# Patient Record
Sex: FEMALE | Race: WHITE | NOT HISPANIC OR LATINO
[De-identification: names, ages, dates, MRNs, and addresses within clinical notes are randomized per-mention and may not be internally consistent; named-entity substitution may affect disease eponyms.]

---

## 2021-10-21 ENCOUNTER — APPOINTMENT (OUTPATIENT)
Dept: PLASTIC SURGERY | Facility: CLINIC | Age: 46
End: 2021-10-21
Payer: SELF-PAY

## 2021-10-21 PROBLEM — Z00.00 ENCOUNTER FOR PREVENTIVE HEALTH EXAMINATION: Status: ACTIVE | Noted: 2021-10-21

## 2021-10-21 PROCEDURE — 99212 OFFICE O/P EST SF 10 MIN: CPT | Mod: NC

## 2021-11-05 ENCOUNTER — APPOINTMENT (OUTPATIENT)
Dept: PLASTIC SURGERY | Facility: CLINIC | Age: 46
End: 2021-11-05
Payer: SELF-PAY

## 2021-11-05 PROCEDURE — 15789 CHEMICAL PEEL FACIAL DERMAL: CPT | Mod: 78

## 2021-12-09 ENCOUNTER — APPOINTMENT (OUTPATIENT)
Dept: PLASTIC SURGERY | Facility: CLINIC | Age: 46
End: 2021-12-09
Payer: SELF-PAY

## 2021-12-09 PROCEDURE — 15789 CHEMICAL PEEL FACIAL DERMAL: CPT | Mod: 78

## 2021-12-30 ENCOUNTER — APPOINTMENT (OUTPATIENT)
Dept: PLASTIC SURGERY | Facility: CLINIC | Age: 46
End: 2021-12-30
Payer: SELF-PAY

## 2021-12-30 PROCEDURE — 15789 CHEMICAL PEEL FACIAL DERMAL: CPT | Mod: 78

## 2022-01-13 ENCOUNTER — TRANSCRIPTION ENCOUNTER (OUTPATIENT)
Age: 47
End: 2022-01-13

## 2022-02-18 ENCOUNTER — APPOINTMENT (OUTPATIENT)
Dept: PLASTIC SURGERY | Facility: CLINIC | Age: 47
End: 2022-02-18
Payer: SELF-PAY

## 2022-02-18 PROCEDURE — 15789 CHEMICAL PEEL FACIAL DERMAL: CPT | Mod: 78

## 2022-04-20 ENCOUNTER — APPOINTMENT (OUTPATIENT)
Dept: PLASTIC SURGERY | Facility: CLINIC | Age: 47
End: 2022-04-20
Payer: SELF-PAY

## 2022-04-20 PROCEDURE — 15789 CHEMICAL PEEL FACIAL DERMAL: CPT | Mod: 78

## 2022-05-08 ENCOUNTER — APPOINTMENT (OUTPATIENT)
Dept: AFTER HOURS CARE | Facility: EMERGENCY ROOM | Age: 47
End: 2022-05-08
Payer: COMMERCIAL

## 2022-05-08 PROCEDURE — 99204 OFFICE O/P NEW MOD 45 MIN: CPT | Mod: 95

## 2022-05-08 NOTE — ASSESSMENT
[FreeTextEntry1] : 47 yo woman with PMHx of Post-treatment Lyme disease syndrome, depression and anxiety states that she just went away for a conference to [place].  She returned a few days ago.  Yesterday noted some mild congestion and sore throat.  Today awoke with myalgias, cough, nausea, and headache.  No loss of tasted or smell.  No vomiting or diarrhea.  No fever.  Patient does not have a pulse oximeter.  She did a home test today that was positive.  She is full vaccinated and booster was received on 11/21.  She felt some mild SOB at one point, but is fine at this time and has no exertional SOB.  \par \par Impression:\par \par Mild COVID with low risk for progression

## 2022-05-08 NOTE — PHYSICAL EXAM
[No Acute Distress] : no acute distress [Well Nourished] : well nourished [Well Developed] : well developed [Well-Appearing] : well-appearing [Normal Sclera/Conjunctiva] : normal sclera/conjunctiva [Normal Outer Ear/Nose] : the outer ears and nose were normal in appearance [No Respiratory Distress] : no respiratory distress  [No Accessory Muscle Use] : no accessory muscle use [No Joint Swelling] : no joint swelling [No Rash] : no rash [No Focal Deficits] : no focal deficits [Speech Grossly Normal] : speech grossly normal [Normal Affect] : the affect was normal [Alert and Oriented x3] : oriented to person, place, and time [Normal Insight/Judgement] : insight and judgment were intact

## 2022-05-08 NOTE — HISTORY OF PRESENT ILLNESS
[Home] : at home, [unfilled] , at the time of the visit. [Other Location: e.g. Home (Enter Location, City,State)___] : at [unfilled] [Verbal consent obtained from patient] : the patient, [unfilled] [FreeTextEntry8] : 47 yo woman with PMHx of Post-treatment Lyme disease syndrome, depression and anxiety states that she just went away for a conference to [place].  She returned a few days ago.  Yesterday noted some mild congestion and sore throat.  Today awoke with myalgias, cough, nausea, and headache.  No loss of tasted or smell.  No vomiting or diarrhea.  No fever.  Patient does not have a pulse oximeter.  She did a home test today that was positive.  She is full vaccinated and booster was received on 11/21.  She felt some mild SOB at one point, but is fine at this time and has no exertional SOB.

## 2022-05-08 NOTE — REVIEW OF SYSTEMS
[Fever] : no fever [Chills] : no chills [Fatigue] : fatigue [Hot Flashes] : no hot flashes [Hoarseness] : no hoarseness [Nasal Discharge] : nasal discharge [Sore Throat] : sore throat [Postnasal Drip] : postnasal drip [Shortness Of Breath] : shortness of breath [Wheezing] : no wheezing [Cough] : cough [Dyspnea on Exertion] : no dyspnea on exertion [Abdominal Pain] : no abdominal pain [Nausea] : nausea [Diarrhea] : diarrhea [Vomiting] : no vomiting [Melena] : no melena [Joint Stiffness] : no joint stiffness [Joint Swelling] : no joint swelling [Muscle Weakness] : no muscle weakness [Muscle Pain] : muscle pain [Back Pain] : no back pain [Headache] : headache [Dizziness] : no dizziness [Fainting] : no fainting [Confusion] : no confusion [Memory Loss] : no memory loss [Unsteady Walking] : no ataxia [Negative] : Heme/Lymph

## 2022-05-24 ENCOUNTER — RESULT REVIEW (OUTPATIENT)
Age: 47
End: 2022-05-24

## 2022-06-02 ENCOUNTER — APPOINTMENT (OUTPATIENT)
Dept: PLASTIC SURGERY | Facility: CLINIC | Age: 47
End: 2022-06-02
Payer: SELF-PAY

## 2022-06-02 PROCEDURE — 15789 CHEMICAL PEEL FACIAL DERMAL: CPT | Mod: 78

## 2022-07-14 ENCOUNTER — APPOINTMENT (OUTPATIENT)
Dept: PLASTIC SURGERY | Facility: CLINIC | Age: 47
End: 2022-07-14

## 2022-07-14 PROCEDURE — 15789 CHEMICAL PEEL FACIAL DERMAL: CPT | Mod: 78

## 2022-10-03 ENCOUNTER — APPOINTMENT (OUTPATIENT)
Dept: PLASTIC SURGERY | Facility: CLINIC | Age: 47
End: 2022-10-03

## 2022-10-03 PROCEDURE — 15789 CHEMICAL PEEL FACIAL DERMAL: CPT | Mod: 78

## 2022-10-06 ENCOUNTER — APPOINTMENT (OUTPATIENT)
Dept: PLASTIC SURGERY | Facility: CLINIC | Age: 47
End: 2022-10-06

## 2022-10-18 ENCOUNTER — APPOINTMENT (OUTPATIENT)
Dept: PLASTIC SURGERY | Facility: CLINIC | Age: 47
End: 2022-10-18

## 2022-10-18 VITALS
SYSTOLIC BLOOD PRESSURE: 122 MMHG | TEMPERATURE: 98.7 F | HEART RATE: 75 BPM | OXYGEN SATURATION: 95 % | DIASTOLIC BLOOD PRESSURE: 76 MMHG | RESPIRATION RATE: 20 BRPM

## 2022-10-18 DIAGNOSIS — U07.1 COVID-19: ICD-10-CM

## 2022-10-18 PROCEDURE — 99203 OFFICE O/P NEW LOW 30 MIN: CPT | Mod: NC

## 2022-10-19 RX ORDER — ESCITALOPRAM OXALATE 10 MG/1
10 TABLET ORAL
Qty: 30 | Refills: 0 | Status: ACTIVE | COMMUNITY
Start: 2022-09-29

## 2022-10-19 RX ORDER — ALPRAZOLAM 0.25 MG/1
0.25 TABLET ORAL
Qty: 10 | Refills: 0 | Status: ACTIVE | COMMUNITY
Start: 2022-09-29

## 2022-10-19 NOTE — HISTORY OF PRESENT ILLNESS
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 The instructions were reviewed in detail with MARLYN.

## 2022-10-19 NOTE — ASSESSMENT
[FreeTextEntry1] : MARLYN is prepared for series of micro needle with prp sessions The risks, benefits, alternatives, limitations and the permanent scars were outlined with the patient. All of MARLYN 's questions and concerns were addressed and answered completely

## 2022-11-09 ENCOUNTER — APPOINTMENT (OUTPATIENT)
Dept: PLASTIC SURGERY | Facility: CLINIC | Age: 47
End: 2022-11-09

## 2022-11-09 PROCEDURE — 99211 OFF/OP EST MAY X REQ PHY/QHP: CPT | Mod: NC

## 2022-11-15 ENCOUNTER — APPOINTMENT (OUTPATIENT)
Dept: PLASTIC SURGERY | Facility: CLINIC | Age: 47
End: 2022-11-15

## 2022-11-15 PROCEDURE — 15789 CHEMICAL PEEL FACIAL DERMAL: CPT

## 2022-12-06 ENCOUNTER — APPOINTMENT (OUTPATIENT)
Dept: PLASTIC SURGERY | Facility: CLINIC | Age: 47
End: 2022-12-06

## 2022-12-06 PROCEDURE — 15789 CHEMICAL PEEL FACIAL DERMAL: CPT

## 2022-12-12 NOTE — PROCEDURE
[Nl] : None [FreeTextEntry1] : rhytids  [FreeTextEntry2] : micro needle prp [FreeTextEntry6] : Ms. MARLYN OLVERA is 47 year Non- or  female complaining of skin laxity and rhytids.  She desires micro needling with Platelet Rich Plasma (PRP) for improvement in the skin tone.  The risks, benefits, alternatives, limitations and potential for permanent scarring were outlined with the patient preoperatively.   Under topical anesthetic and aseptic conditions, the patient was treated with blood drawn from a peripheral vein.  The blood was centrifuged with a proprietary tube to separate the red blood cells from the platelet rich plasma.  Approximately 6 cc of PRP was harvested and  prepared.  Micro needling was performed on the face and neck and tolerated well.  The platelet rich plasma was used as a lubricant for and as an addition to permeate the micro holes created by the instrument.  Please see accompanying sheet for depth of needle per facial or neck site.  She tolerated the procedure well and instructions were given  . Aquaphor was applied at the completion and a follow up appointment was made.\par

## 2022-12-12 NOTE — ASSESSMENT
[FreeTextEntry1] : MARLYN tolerated the procedure well. The instructions were reviewed in detail with MARLYN. All of MARLYN 's questions and concerns were addressed and answered completely MARLYN will return to the office for a post procedure visit

## 2022-12-19 NOTE — ASSESSMENT
[FreeTextEntry1] : MARLYN tolerated the procedure well. .oinst All of MARLYN 's questions and concerns were addressed and answered completely MARLYN will return to the office for another procedure visit 3 weeks

## 2023-01-03 ENCOUNTER — APPOINTMENT (OUTPATIENT)
Dept: PLASTIC SURGERY | Facility: CLINIC | Age: 48
End: 2023-01-03

## 2023-01-03 ENCOUNTER — APPOINTMENT (OUTPATIENT)
Dept: PLASTIC SURGERY | Facility: CLINIC | Age: 48
End: 2023-01-03
Payer: SELF-PAY

## 2023-01-03 PROCEDURE — 15789 CHEMICAL PEEL FACIAL DERMAL: CPT

## 2023-01-09 NOTE — ASSESSMENT
[FreeTextEntry1] : MARLYN tolerated the procedure well. MARLYN  has had uncomplicated recovery from surgery and anesthesia The instructions were reviewed in detail with MARLYN. All of MARLYN 's questions and concerns were addressed and answered completely MARLYN will return to the office for a post procedure visit

## 2023-01-09 NOTE — PROCEDURE
[FreeTextEntry6] : Ms. MARLYN OLVERA is 47 year Non- or  female complaining of skin laxity and rhytids.  She desires micro needling with Platelet Rich Plasma (PRP) for improvement in the skin tone.  The risks, benefits, alternatives, limitations and potential for permanent scarring were outlined with the patient preoperatively.   Under topical anesthetic and aseptic conditions, the patient was treated with blood drawn from a peripheral vein.  The blood was centrifuged with a proprietary tube to separate the red blood cells from the platelet rich plasma.  Approximately 6 cc of PRP was harvested and  prepared.  Micro needling was performed on the face and neck and tolerated well.  The platelet rich plasma was used as a lubricant for and as an addition to permeate the micro holes created by the instrument.  Please see accompanying sheet for depth of needle per facial or neck site.  She tolerated the procedure well and instructions were given  . Aquaphor was applied at the completion and a follow up appointment was made.

## 2023-03-03 ENCOUNTER — APPOINTMENT (OUTPATIENT)
Dept: PLASTIC SURGERY | Facility: CLINIC | Age: 48
End: 2023-03-03
Payer: SELF-PAY

## 2023-03-03 PROCEDURE — 15789 CHEMICAL PEEL FACIAL DERMAL: CPT

## 2023-04-11 ENCOUNTER — APPOINTMENT (OUTPATIENT)
Dept: PLASTIC SURGERY | Facility: CLINIC | Age: 48
End: 2023-04-11
Payer: SELF-PAY

## 2023-04-11 PROCEDURE — 15789 CHEMICAL PEEL FACIAL DERMAL: CPT

## 2023-05-30 ENCOUNTER — APPOINTMENT (OUTPATIENT)
Dept: PLASTIC SURGERY | Facility: CLINIC | Age: 48
End: 2023-05-30
Payer: SELF-PAY

## 2023-05-30 PROCEDURE — 15789 CHEMICAL PEEL FACIAL DERMAL: CPT

## 2023-06-29 ENCOUNTER — APPOINTMENT (OUTPATIENT)
Dept: NEUROLOGY | Facility: CLINIC | Age: 48
End: 2023-06-29
Payer: COMMERCIAL

## 2023-06-29 VITALS
DIASTOLIC BLOOD PRESSURE: 60 MMHG | OXYGEN SATURATION: 95 % | WEIGHT: 130 LBS | BODY MASS INDEX: 22.2 KG/M2 | HEART RATE: 66 BPM | HEIGHT: 64 IN | TEMPERATURE: 98 F | SYSTOLIC BLOOD PRESSURE: 90 MMHG

## 2023-06-29 PROCEDURE — 99203 OFFICE O/P NEW LOW 30 MIN: CPT

## 2023-06-29 RX ORDER — NIRMATRELVIR AND RITONAVIR 300-100 MG
20 X 150 MG & KIT ORAL
Qty: 1 | Refills: 0 | Status: DISCONTINUED | COMMUNITY
Start: 2022-05-08 | End: 2023-06-29

## 2023-06-30 NOTE — CONSULT LETTER
[Dear  ___] : Dear  [unfilled], [Consult Letter:] : I had the pleasure of evaluating your patient, [unfilled]. [Please see my note below.] : Please see my note below. [Consult Closing:] : Thank you very much for allowing me to participate in the care of this patient.  If you have any questions, please do not hesitate to contact me. [Sincerely,] : Sincerely, [FreeTextEntry3] : Mary Tinsley PA-C

## 2023-06-30 NOTE — PHYSICAL EXAM
[FreeTextEntry1] : Constitutional: Well nourished, well developed adult in no distress\par Psych: Mood broad, affect not restricted\par Head: NCAT\par Eyes: Anicteric\par Ears: Normal appearing pinna\par Neck: Normal appearing and not enlarged\par Lungs: No accessory muscle use, no respiratory distress\par Cardiac/Vascular: No edema\par Abdomen: Non-distended\par Skin: No visible lesions or rashes. Skin is warm and dry\par \par Neurological Examination\par Mental status: Awake, alert, appropriate.  Gives detailed history. oriented to person, place, and time.\par \par Cranial nerves  \par II: Visual acuity grossly intact, visual fields full\par III, IV, VI: PERRLA, EOMI, no nystagmus \par V: Facial sensation is normal B/L.    \par VII: Facial strength is normal B/L.  \par VIII:   Hearing grossly intact bilaterally to spoken voice although not formally assessed\par IX, X: Palate is midline and elevates symmetrically.    \par XI: Trapezius normal strength.    \par XII: Tongue midline without atrophy or fasciculations.    \par \par Speech: No aphasia, dysarthria.  Normal intonation and prosody of speech\par Motor: Normal muscle bulk and tone throughout.  \par Muscle Strength: 5/5 muscle strength throughout bilateral upper and lower extremities with assessed shoulder abduction, elbow flexion/extension, hand , hip flexion, knee flexion/extension, ankle plantarflexion/dorsiflexion\par Reflexes: 2+ reflexes throughout. \par Coordination No ataxia - FTN, HTS within normal limits. No tremor or dysmetria\par Sensory: Sensation to light touch intact bilateral upper and lower extremities\par Station: No loss of balance.\par Gait  Normal stance, no loss of balance

## 2023-06-30 NOTE — DATA REVIEWED
[de-identified] : MRI brain with/without contrast: Unremarkable study \par MRA brain with/without contrast: Unremarkable brain MRA

## 2023-06-30 NOTE — ASSESSMENT
[FreeTextEntry1] : Patient is a 46yo female who presents for evaluation of exercise induced headaches.  Patient has unremarkable MRI and MRA head/neck, will obtain MRV to exclude disruption of venous circulation as etiology of headaches.  Suspect that this may be related to benign primary exercise headache as she meets all criteria at this time.   \par \par -Obtain MRV head to rule out secondary cause of headache\par -Patient recommended to avoid significant strenuous activity while evaluating further, headache has been present for 10 years without complication, unlikely at this point but recommend out of an abundance of caution.  No restriction on activities such as weight lifting that otherwise do not seem to provoke headaches.\par -Patient defers need for pharmacologic therapy at this time, if so requested the use of indomethacin as prophylaxis prior to exercise would be optimal therapy\par -Follow up in 6-8 weeks to review imaging studies and discuss need for medications at this time

## 2023-06-30 NOTE — HISTORY OF PRESENT ILLNESS
[FreeTextEntry1] : Patient is a 46yo female with unremarkable past medical history otherwise Lyme (treated with doxy), anxiety, depression who presents for evaluation of headache induced by cardiovascular activity.\par \par Frequency: Only with with cardio\par Severity: max 9/10, typically 5-6/10\par Duration: Lasts for a few hours to remainder of the day\par Quality: Pulsating\par Location: More on right side, sometimes into neck/behind the eyes\par Photophobia/Phonophobia: +photophobia when severe, negative phonophobia\par Nausea/Vomiting: +nausea when severe, otherwise negative\par Sensory auras: None\par Other associated symptoms: Neck stiffness noticed (although chronic difficult to isolate), most severe had slightly lightheadedness negative SOB, chest pain.\par Disabling Deficits: None\par Medication Trial: Tylenol/advil takes edge\par Alleviating Factors: Sleep, OTC meds \par Aggravating Factors: Exercise/intense cardio - does not occur with strength workout\par Positional Component: No\par Other: Negative increase with valsalva. Headaches preceded lyme disease. \par \par She is scheduled for a nerve ablation in her neck (at unclear level) this week.  She also has history of migraine with aura, most prominently aura rather than pain.  For the most part able to manage with acetaminophen/ibuprofen for these. She has chronic neck pain which is unchanged and unrelated to current issue.  \par \par She finds that if she exercises, typically with cardio exercise, she reliably develops a headache.  She has had these on and off for about 10 years.  She had Lyme disease and had some tingling and brain fog from this.  She tends to have soft blood pressure and can feel lightheadedness.  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  She has had MRI brain w/wo contrast and MRA which were unremarkable.  She is primarily concerned about ruling out sinister underlying causes rather than treatment based management. 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

## 2023-08-14 ENCOUNTER — RESULT REVIEW (OUTPATIENT)
Age: 48
End: 2023-08-14

## 2023-08-15 ENCOUNTER — TRANSCRIPTION ENCOUNTER (OUTPATIENT)
Age: 48
End: 2023-08-15

## 2023-08-15 ENCOUNTER — APPOINTMENT (OUTPATIENT)
Dept: PLASTIC SURGERY | Facility: CLINIC | Age: 48
End: 2023-08-15
Payer: COMMERCIAL

## 2023-08-15 PROCEDURE — 15789 CHEMICAL PEEL FACIAL DERMAL: CPT | Mod: 78

## 2023-08-21 NOTE — REASON FOR VISIT
[TextEntry] : Emily came in for hydrafacial treatment. 30% glysal applied. extractions, blue and red light applied, facial massaged with daily power, spf and aox

## 2023-09-07 ENCOUNTER — APPOINTMENT (OUTPATIENT)
Dept: NEUROLOGY | Facility: CLINIC | Age: 48
End: 2023-09-07
Payer: COMMERCIAL

## 2023-09-07 VITALS
HEIGHT: 64 IN | BODY MASS INDEX: 22.2 KG/M2 | TEMPERATURE: 98.2 F | SYSTOLIC BLOOD PRESSURE: 90 MMHG | DIASTOLIC BLOOD PRESSURE: 60 MMHG | OXYGEN SATURATION: 98 % | WEIGHT: 130 LBS | HEART RATE: 86 BPM

## 2023-09-07 PROCEDURE — 99213 OFFICE O/P EST LOW 20 MIN: CPT

## 2023-09-07 RX ORDER — INDOMETHACIN 25 MG/1
25 CAPSULE ORAL
Qty: 10 | Refills: 1 | Status: ACTIVE | COMMUNITY
Start: 2023-09-07 | End: 1900-01-01

## 2023-09-07 NOTE — HISTORY OF PRESENT ILLNESS
[FreeTextEntry1] : Patient is a 46yo female with unremarkable past medical history otherwise Lyme (treated with doxy), anxiety, depression who presents for evaluation of headache induced by cardiovascular activity.  9/7/23 She is no longer running which is related to other orthopedic issues.  She finds that it would cause issues only about once weekly.  She has not had one since having last visit due to change in activity.  She gets tension type headaches.  She had a nerve ablation since last visit.  She gets an aura of migraine prior to cycle.    Headaches remain stable from last visit, she is avoiding triggers so they have been less frequent but remain the same quality Frequency: Only with with intense cardio Severity: max 9/10, typically 5-6/10 Duration: Lasts for a few hours to remainder of the day Quality: Pulsating Location: More on right side, sometimes into neck/behind the eyes Photophobia/Phonophobia: +photophobia when severe, negative phonophobia Nausea/Vomiting: +nausea when severe, otherwise negative Sensory auras: None Other associated symptoms: Neck stiffness noticed (although chronic difficult to isolate), most severe had slightly lightheadedness negative SOB, chest pain. Disabling Deficits: None Medication Trial: Tylenol/advil takes edge Alleviating Factors: Sleep, OTC meds  Aggravating Factors: Exercise/intense cardio - does not occur with strength workout Positional Component: No Other: Negative increase with valsalva. Headaches preceded lyme disease.   6/29/23 Frequency: Only with with cardio Severity: max 9/10, typically 5-6/10 Duration: Lasts for a few hours to remainder of the day Quality: Pulsating Location: More on right side, sometimes into neck/behind the eyes Photophobia/Phonophobia: +photophobia when severe, negative phonophobia Nausea/Vomiting: +nausea when severe, otherwise negative Sensory auras: None Other associated symptoms: Neck stiffness noticed (although chronic difficult to isolate), most severe had slightly lightheadedness negative SOB, chest pain. Disabling Deficits: None Medication Trial: Tylenol/advil takes edge Alleviating Factors: Sleep, OTC meds  Aggravating Factors: Exercise/intense cardio - does not occur with strength workout Positional Component: No Other: Negative increase with valsalva. Headaches preceded lyme disease.   She is scheduled for a nerve ablation in her neck (at unclear level) this week.  She also has history of migraine with aura, most prominently aura rather than pain.  For the most part able to manage with acetaminophen/ibuprofen for these. She has chronic neck pain which is unchanged and unrelated to current issue.    She finds that if she exercises, typically with cardio exercise, she reliably develops a headache.  She has had these on and off for about 10 years.  She had Lyme disease and had some tingling and brain fog from this.  She tends to have soft blood pressure and can feel lightheadedness.  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  She has had MRI brain w/wo contrast and MRA which were unremarkable.  She is primarily concerned about ruling out sinister underlying causes rather than treatment based management.   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

## 2023-09-07 NOTE — DATA REVIEWED
[de-identified] : MRI brain with/without contrast: Unremarkable study  MRA brain with/without contrast: Unremarkable brain MRA  MRV:  No MR evidence for dural venous thrombosis.

## 2023-09-07 NOTE — ASSESSMENT
[FreeTextEntry1] : Patient is a 46yo female who presents for evaluation of exercise induced headaches.  Patient has unremarkable MRI and MRA head/neck, MRV.  Suspect that this may be related to benign primary exercise headache as she meets all criteria at this time.     -May resume normal activity as tolerated, no contraindication -Indomethicin 25mg PRN 30-60 minutes prior to exercise -Follow up in 6-8 weeks with telemedicine to assess medication efficacy

## 2023-09-27 ENCOUNTER — APPOINTMENT (OUTPATIENT)
Dept: PLASTIC SURGERY | Facility: CLINIC | Age: 48
End: 2023-09-27
Payer: COMMERCIAL

## 2023-09-27 PROCEDURE — 15789 CHEMICAL PEEL FACIAL DERMAL: CPT | Mod: 78

## 2023-10-20 ENCOUNTER — APPOINTMENT (OUTPATIENT)
Dept: NEUROLOGY | Facility: CLINIC | Age: 48
End: 2023-10-20
Payer: COMMERCIAL

## 2023-10-20 DIAGNOSIS — G44.84 PRIMARY EXERTIONAL HEADACHE: ICD-10-CM

## 2023-10-20 PROCEDURE — 99212 OFFICE O/P EST SF 10 MIN: CPT | Mod: 95

## 2023-10-26 PROBLEM — G44.84 EXERTIONAL HEADACHE: Status: ACTIVE | Noted: 2023-06-29

## 2023-11-21 ENCOUNTER — APPOINTMENT (OUTPATIENT)
Dept: PLASTIC SURGERY | Facility: CLINIC | Age: 48
End: 2023-11-21

## 2023-12-28 ENCOUNTER — APPOINTMENT (OUTPATIENT)
Dept: PLASTIC SURGERY | Facility: CLINIC | Age: 48
End: 2023-12-28
Payer: SELF-PAY

## 2023-12-28 PROCEDURE — 15789 CHEMICAL PEEL FACIAL DERMAL: CPT

## 2024-02-01 ENCOUNTER — APPOINTMENT (OUTPATIENT)
Dept: PLASTIC SURGERY | Facility: CLINIC | Age: 49
End: 2024-02-01
Payer: SELF-PAY

## 2024-02-01 PROCEDURE — 99024 POSTOP FOLLOW-UP VISIT: CPT

## 2024-02-02 NOTE — HISTORY OF PRESENT ILLNESS
[FreeTextEntry1] : MARLYN completed a series of micro needle and prp session and desires to have another 3 sessions The risks, benefits, alternatives, limitations and the permanent scars were outlined with the patient. Discussion of risks included but not limited to bleeding, infection, delayed healing, and anesthetic risk. All of MARLYN 's questions and concerns were addressed and answered completely  .isnt .she does not desires to proceed with rf microneedling at this time

## 2024-03-26 ENCOUNTER — APPOINTMENT (OUTPATIENT)
Dept: PLASTIC SURGERY | Facility: CLINIC | Age: 49
End: 2024-03-26
Payer: SELF-PAY

## 2024-03-26 VITALS
HEART RATE: 62 BPM | RESPIRATION RATE: 22 BRPM | OXYGEN SATURATION: 100 % | SYSTOLIC BLOOD PRESSURE: 115 MMHG | DIASTOLIC BLOOD PRESSURE: 68 MMHG | TEMPERATURE: 97.2 F

## 2024-03-26 PROCEDURE — MS041: CPT

## 2024-04-10 NOTE — PROCEDURE
[FreeTextEntry6] : Ms. MARLYN OLVERA is 48 year Non- or  female complaining of skin laxity and rhytids.  She desires micro needling with Platelet Rich Plasma (PRP) for improvement in the skin tone.  The risks, benefits, alternatives, limitations and potential for permanent scarring were outlined with the patient preoperatively.   Under topical anesthetic and aseptic conditions, the patient was treated with blood drawn from a peripheral vein.  The blood was centrifuged with a proprietary tube to separate the red blood cells from the platelet rich plasma.  Approximately 6 cc of PRP was harvested and  prepared.  Micro needling was performed on the face and neck and tolerated well.  The platelet rich plasma was used as a lubricant for and as an addition to permeate the micro holes created by the instrument.  Please see accompanying sheet for depth of needle per facial or neck site.  She tolerated the procedure well and instructions were given  . Aquaphor was applied at the completion and a follow up appointment was made.

## 2024-04-10 NOTE — ASSESSMENT
[FreeTextEntry1] : MARLYN tolerated the procedure well. The instructions were reviewed in detail with MARLYN. MARLYN will return to the office for a post procedure visit 1 month

## 2024-04-16 ENCOUNTER — APPOINTMENT (OUTPATIENT)
Dept: PLASTIC SURGERY | Facility: CLINIC | Age: 49
End: 2024-04-16
Payer: SELF-PAY

## 2024-04-16 VITALS
HEART RATE: 68 BPM | RESPIRATION RATE: 20 BRPM | DIASTOLIC BLOOD PRESSURE: 46 MMHG | SYSTOLIC BLOOD PRESSURE: 104 MMHG | TEMPERATURE: 98.4 F | OXYGEN SATURATION: 95 %

## 2024-04-16 PROCEDURE — MS041: CPT

## 2024-04-17 NOTE — ASSESSMENT
[FreeTextEntry1] : MARLYN tolerated the procedure well. The instructions were reviewed in detail with MARLYN. MARLYN will return to the office for a post procedure visit

## 2024-05-07 ENCOUNTER — APPOINTMENT (OUTPATIENT)
Dept: PLASTIC SURGERY | Facility: CLINIC | Age: 49
End: 2024-05-07
Payer: SELF-PAY

## 2024-05-07 DIAGNOSIS — L98.8 OTHER SPECIFIED DISORDERS OF THE SKIN AND SUBCUTANEOUS TISSUE: ICD-10-CM

## 2024-05-07 PROCEDURE — MS041: CPT

## 2024-05-07 NOTE — ASSESSMENT
[FreeTextEntry1] : MARLYN tolerated the procedure well. The instructions were reviewed in detail with MARLYN. All of MARLYN 's questions and concerns were addressed and answered completely  MARLYN will return to the office for a post procedure visit as needed

## 2024-05-07 NOTE — PROCEDURE
[FreeTextEntry6] : Ms. MARLYN OLVERA is 48 year w female complaining of skin laxity and rhytids.  She desires micro needling with Platelet Rich Plasma (PRP) for improvement in the skin tone.  The risks, benefits, alternatives, limitations and potential for permanent scarring were outlined with the patient preoperatively.   Under topical anesthetic and aseptic conditions, the patient was treated with blood drawn from a peripheral vein.  The blood was centrifuged with a proprietary tube to separate the red blood cells from the platelet rich plasma.  Approximately 6 cc of PRP was harvested and  prepared.  Micro needling was performed on the face and neck and tolerated well.  The platelet rich plasma was used as a lubricant for and as an addition to permeate the micro holes created by the instrument.  Please see accompanying sheet for depth of needle per facial or neck site.  She tolerated the procedure well and instructions were given  . Aquaphor was applied at the completion and a follow up appointment was made.

## 2024-06-12 ENCOUNTER — APPOINTMENT (OUTPATIENT)
Dept: PLASTIC SURGERY | Facility: CLINIC | Age: 49
End: 2024-06-12
Payer: SELF-PAY

## 2024-06-12 PROCEDURE — MS002: CPT

## 2024-07-19 ENCOUNTER — APPOINTMENT (OUTPATIENT)
Dept: PLASTIC SURGERY | Facility: CLINIC | Age: 49
End: 2024-07-19

## 2024-07-19 PROCEDURE — MS002: CPT

## 2024-09-13 ENCOUNTER — APPOINTMENT (OUTPATIENT)
Dept: PLASTIC SURGERY | Facility: CLINIC | Age: 49
End: 2024-09-13
Payer: SELF-PAY

## 2024-09-13 PROCEDURE — MS004: CPT

## 2024-11-18 ENCOUNTER — APPOINTMENT (OUTPATIENT)
Dept: PLASTIC SURGERY | Facility: CLINIC | Age: 49
End: 2024-11-18

## 2024-11-18 PROCEDURE — MS003: CPT

## 2024-11-18 PROCEDURE — MS002: CPT

## 2025-01-10 ENCOUNTER — APPOINTMENT (OUTPATIENT)
Dept: PLASTIC SURGERY | Facility: CLINIC | Age: 50
End: 2025-01-10

## 2025-01-10 PROCEDURE — MS002: CPT

## 2025-04-17 ENCOUNTER — APPOINTMENT (OUTPATIENT)
Dept: PLASTIC SURGERY | Facility: CLINIC | Age: 50
End: 2025-04-17
Payer: SELF-PAY

## 2025-04-17 PROCEDURE — MS014: CPT

## 2025-06-27 ENCOUNTER — APPOINTMENT (OUTPATIENT)
Dept: PLASTIC SURGERY | Facility: CLINIC | Age: 50
End: 2025-06-27

## 2025-06-27 PROCEDURE — MS002: CPT

## 2025-08-19 ENCOUNTER — APPOINTMENT (OUTPATIENT)
Dept: PLASTIC SURGERY | Facility: CLINIC | Age: 50
End: 2025-08-19

## 2025-09-10 ENCOUNTER — APPOINTMENT (OUTPATIENT)
Dept: PLASTIC SURGERY | Facility: CLINIC | Age: 50
End: 2025-09-10
Payer: SELF-PAY

## 2025-09-10 PROCEDURE — MS003: CPT
